# Patient Record
Sex: MALE | Race: BLACK OR AFRICAN AMERICAN | NOT HISPANIC OR LATINO | URBAN - METROPOLITAN AREA
[De-identification: names, ages, dates, MRNs, and addresses within clinical notes are randomized per-mention and may not be internally consistent; named-entity substitution may affect disease eponyms.]

---

## 2022-03-09 ENCOUNTER — OFFICE VISIT (OUTPATIENT)
Dept: FAMILY MEDICINE | Facility: CLINIC | Age: 42
End: 2022-03-09
Payer: COMMERCIAL

## 2022-03-09 VITALS
DIASTOLIC BLOOD PRESSURE: 95 MMHG | WEIGHT: 199.8 LBS | SYSTOLIC BLOOD PRESSURE: 166 MMHG | RESPIRATION RATE: 16 BRPM | BODY MASS INDEX: 30.38 KG/M2 | TEMPERATURE: 98.4 F | HEART RATE: 73 BPM | OXYGEN SATURATION: 98 %

## 2022-03-09 DIAGNOSIS — E55.9 VITAMIN D DEFICIENCY: ICD-10-CM

## 2022-03-09 DIAGNOSIS — Z83.3 FAMILY HISTORY OF DIABETES MELLITUS: ICD-10-CM

## 2022-03-09 DIAGNOSIS — R25.2 LEG CRAMPING: ICD-10-CM

## 2022-03-09 DIAGNOSIS — I10 HYPERTENSION, UNSPECIFIED TYPE: ICD-10-CM

## 2022-03-09 DIAGNOSIS — D17.30 LIPOMA OF SKIN AND SUBCUTANEOUS TISSUE: Primary | ICD-10-CM

## 2022-03-09 PROCEDURE — 99204 OFFICE O/P NEW MOD 45 MIN: CPT | Mod: GC | Performed by: STUDENT IN AN ORGANIZED HEALTH CARE EDUCATION/TRAINING PROGRAM

## 2022-03-09 RX ORDER — LISINOPRIL 20 MG/1
20 TABLET ORAL DAILY
Qty: 30 TABLET | Refills: 1 | Status: SHIPPED | OUTPATIENT
Start: 2022-03-09

## 2022-03-09 RX ORDER — ADHESIVE BANDAGE 3/4"
BANDAGE TOPICAL
Qty: 1 EACH | Refills: 0 | Status: SHIPPED | OUTPATIENT
Start: 2022-03-09

## 2022-03-09 NOTE — PROGRESS NOTES
Assessment and Plan      Shar was seen today for other.    Diagnoses and all orders for this visit:    Lipoma of skin and subcutaneous tissue  Patient has 2.5-year history of left lateral thigh soft tissue mass that measures approximately 9x10 cm with exam consistent with probable lipoma.  Patient does have some discomfort in the region, does refer to general surgery for evaluation and possible excision.  -     Adult General Surg Referral; Future    Leg cramping  Patient reports chronic pain on the left lateral thigh, left calf, and other nonspecific pain on left ankle for the past 2+ years.  Other than after mentioned probable lipoma, no clear abnormality noted on exam.  Pain could be leg cramping due to electrolyte abnormality, but less likely given unilateral nature.  Possibly vitamin D deficiency.  Unlikely to have muscle strain that lasts this long.  Will call patient with results of BMP and vitamin D test result.  -     Basic metabolic panel  -     Vitamin D deficiency screening    Hypertension, unspecified type  BP in upper 160s/90s and reports history of hypertension with prior structures to be on at hypertensive, but ran out of medications a long time ago.  Will check BMP and start patient on lisinopril 20 mg p.o. daily with follow-up in 2 weeks.  Instructed patient to record blood pressures.  Should have BMP recheck at next visit.  -     Basic metabolic panel  -     lisinopril (ZESTRIL) 20 MG tablet; Take 1 tablet (20 mg) by mouth daily  -     Blood Pressure Monitoring (BLOOD PRESSURE CUFF) MISC; Measure blood pressure each morning.    Family history of diabetes mellitus  Patient has a BMI of 30.4 and maternal history of T2DM, thus screened with hemoglobin A1c.  -     Hemoglobin A1c    Vitamin D deficiency  -     Vitamin D deficiency screening    Requested patient provide CALEB to receive records from prior clinic - [facility] in [place].  Schedule patient for CPE on 3/16/2022 with  me.      Options for treatment and follow-up care were reviewed with the patient. Patient engaged in the decision making process and verbalized understanding of the options discussed and agreed with the final plan.    Patient was staffed with attending physician Dr. Bustillos.    Juan Graham MD PGY-2           HPI       Shar Burns is a 41 year old year old male w/ PMH of   Patient Active Problem List   Diagnosis     Infected bite wound of hand    who presents for   Chief Complaint   Patient presents with     other     mass on left leg since 2019 and wants it checked out        Muscle Pain    Onset: Summer 2019    Injury?  no    Description:   Location(s): Left lateral thigh  Character: Cramping, throbbing    Intensity: 8/10 when exacerbated    Progression of Symptoms: same    Accompanying Signs & Symptoms:  Other symptoms: radiation of pain to/from left lateral thigh to foot; numbness of left lateral thigh    History:   Previous similar pain: no      Worsened by    Overuse?: Yes: flexion & extension at waist  Morning Stiffness?:no    Alleviating factors:  Improved by: rest/inactivity, stretching and exercises  Therapies Tried and outcome: as above; ibuprofen without improvement           Review of Systems:   10 point ROS negative other than as specified above.         Physical Exam:   BP (!) 166/95 (BP Location: Left arm, Patient Position: Sitting, Cuff Size: Adult Regular)   Pulse 73   Temp 98.4  F (36.9  C) (Oral)   Resp 16   Wt 90.6 kg (199 lb 12.8 oz)   SpO2 98%   BMI 30.38 kg/m      Vital signs normal except BP and BMI     Exam:  Constitutional: healthy, alert, no distress, and cooperative  Head: Normocephalic  Respiratory: breathing comfortably on RA  Gastrointestinal: Abdomen soft, non-tender  :   Musculoskeletal: Left lateral thigh with a slightly raised 9 x 10 cm superficial cutaneous mass without overlying skin changes; extremities normal- no other gross deformities noted, antalgic gait gait,  and normal muscle tone; normal range of motion; left ankle and heel nontender, normal ROM, no edema.  Skin: no suspicious lesions or rashes  Neurologic: grossly normal CN; normal strength, sensation, & tone  Psychiatric: affect blunted      Results:   Results are ordered and pending

## 2022-03-09 NOTE — PATIENT INSTRUCTIONS
Dear Shar Burns,    1. Today we discussed leg pain.  2. The lump that you are feeling seems like a lipoma. Referred to general surgeon to discuss more.  3. The cause of your leg pain is not entirely clear. We will do some tests today.  4. Take lisinopril for high blood pressure. Record blood pressure measurements.    Please call [facility] with any questions or concerns.    Best regard,    Juan Graham MD      [facility]  Phone: [phone]  Address: [address] [place] [address]      Patient Education     Understanding a Lipoma     A lipoma is a lump under the skin that s made of fat. It s not cancer (benign). It feels soft like rubber when you press it, and in most cases it doesn t hurt. Some people have more than one. A lipoma grows slowly over time and doesn t cause many problems. Lipomas occur most often in adults from ages 40 to 60. They are more common in men.   How to say it  Ly-POH-estiven  What causes a lipoma?  Experts don't know yet what causes lipomas. They are still learning more. They may be partly caused by a problem in a gene. They can run in families. Familial multiple lipomatosis is when 2 or more family members have many lipomas.  Symptoms of a lipoma  The main symptom of a lipoma is a soft lump under the skin that doesn t hurt unless it is pressing on a nerve. It may be small, around 1/4 inch across. Or it may be larger, up to 4 inches across or more.  There are different kinds of lipomas. The most common kind occurs under the skin of the shoulders, chest, back, belly, or under the arms. In some cases, a lipoma can occur on the legs. In rare cases, one may occur deeper in the body or in a muscle.  Treatment for a lipoma  In most cases, a lipoma doesn t need treatment. Your healthcare provider may look at it during regular checkups to see if it changes.  But if the lipoma is painful or you want it removed for cosmetic reasons, it can be removed with surgery.  The surgery is called excision. The lipoma will most likely not grow back after surgery. During surgery, the area around the lipoma is numbed. If you have a deep lipoma, you may need medicine to numb a larger area (regional anesthesia). Or you may need medicine to put you to sleep during the procedure (general anesthesia). Then the doctor makes a cut over the area of the lipoma. He or she removes the lump of fat. The cut is then closed with stitches.  Possible complications of a lipoma  A large lipoma inside the body can press on organs, nerves, or other tissues and cause problems. For example, it can cause problems with breathing or digestion.  Living with a lipoma  Your healthcare provider may look at the lipoma during regular checkups to see if it changes or is causing problems.  When to call your healthcare provider  Call your healthcare provider right away if you have any of these:    Lipoma that grows quickly, causes pain, or feels hard    New lipomas  Jany last reviewed this educational content on 11/1/2018 2000-2021 [employer]. All rights reserved. This information is not intended as a substitute for professional medical care. Always follow your healthcare professional's instructions.

## 2022-03-09 NOTE — PROGRESS NOTES
Preceptor Attestation:    I discussed the patient with the resident and evaluated the patient in person. I have verified the content of the note, which accurately reflects my assessment of the patient and the plan of care.   Supervising Physician:  Jagdeep Bustillos MD.

## 2022-03-16 DIAGNOSIS — Z00.00 ENCOUNTER FOR ROUTINE HISTORY AND PHYSICAL EXAM FOR MALE: Primary | ICD-10-CM
